# Patient Record
Sex: FEMALE | Race: WHITE | Employment: OTHER | URBAN - METROPOLITAN AREA
[De-identification: names, ages, dates, MRNs, and addresses within clinical notes are randomized per-mention and may not be internally consistent; named-entity substitution may affect disease eponyms.]

---

## 2022-03-01 ENCOUNTER — OFFICE VISIT (OUTPATIENT)
Dept: CARDIOLOGY CLINIC | Age: 62
End: 2022-03-01
Payer: COMMERCIAL

## 2022-03-01 VITALS
DIASTOLIC BLOOD PRESSURE: 86 MMHG | OXYGEN SATURATION: 98 % | HEIGHT: 62 IN | RESPIRATION RATE: 18 BRPM | SYSTOLIC BLOOD PRESSURE: 124 MMHG | WEIGHT: 155.8 LBS | BODY MASS INDEX: 28.67 KG/M2 | HEART RATE: 62 BPM

## 2022-03-01 DIAGNOSIS — E78.5 DYSLIPIDEMIA: ICD-10-CM

## 2022-03-01 DIAGNOSIS — R00.0 TACHYARRHYTHMIA: Primary | ICD-10-CM

## 2022-03-01 DIAGNOSIS — I48.91 ATRIAL FIBRILLATION, UNSPECIFIED TYPE (HCC): ICD-10-CM

## 2022-03-01 PROCEDURE — 99204 OFFICE O/P NEW MOD 45 MIN: CPT | Performed by: INTERNAL MEDICINE

## 2022-03-01 PROCEDURE — 93000 ELECTROCARDIOGRAM COMPLETE: CPT | Performed by: INTERNAL MEDICINE

## 2022-03-01 NOTE — PROGRESS NOTES
1. Have you been to the ER, urgent care clinic since your last visit? Hospitalized since your last visit? Yes, 2/22/22, [address], Heart Shocked    2. Have you seen or consulted any other health care providers outside of the [address] [place] [address] since your last visit? Include any pap smears or colon screening.  No         Chief Complaint   Patient presents with    Irregular Heart Beat     C/O  Palpitations

## 2022-03-01 NOTE — PROGRESS NOTES
Subjective:      Cesar Adamson is a 59 y.o. female is here for EP consult. Here to reestablish care. Last seen in 2010. Was with her gradndaughter and felt her heart racing. Went to the [facility] urgent care and was told she was in AF with low K and elevated tsh. I do not have a copy of those records.     Patient Active Problem List    Diagnosis Date Noted    Intervertebral thoracic disc disorder with myelopathy, thoracic region 11/11/2014    Dyslipidemia 06/30/2009    Obesity 06/30/2009    Tachyarrhythmia 06/30/2009    Back pain 06/30/2009    Osteoarthritis 06/30/2009    Family history of colon cancer 06/30/2009      Gaby Meza MD  Past Medical History:   Diagnosis Date    Atrial fibrillation [facility])     Chronic pain     back    Clotting disorder ([facility] [address]     Dyslipidemia 6/30/2009    Ill-defined condition     high cholesterol    Long term current use of anticoagulant therapy     Nausea & vomiting     Obesity 6/30/2009    Osteoarthritis 6/30/2009    Skipped beats     Tachyarrhythmia 6/30/2009    with skipped beats      Past Surgical History:   Procedure Laterality Date    HX HYSTERECTOMY      partial    HX TONSILLECTOMY      [place] BREAST SURGERY PROCEDURE UNLISTED Left     breast biopsy- neg    [place] CARDIAC SURG PROCEDURE UNLIST      ablation     No Known Allergies   Family History   Problem Relation Age of Onset    Cancer Mother         uterine, colon, thyroid, lung    Heart Disease Father     Lung Disease Father     No Known Problems Sister     No Known Problems Sister     No Known Problems Sister     Colon Cancer Other     Diabetes Other     Heart Disease Other     Hypertension Other     Cancer Other         Uterine and Lung    negative for cardiac disease  Social History     Socioeconomic History    Marital status:    Tobacco Use    Smoking status: Former Smoker     Packs/day: 1.50     Years: 10.00     Pack years: 15.00    Smokeless tobacco: Never Used   Vaping Use    Vaping Use: Never used   Substance and Sexual Activity    Alcohol use: Yes     Alcohol/week: 0.8 standard drinks     Types: 1 Cans of beer per week     Comment: occasionally    Drug use: No   Social History Narrative    ** Merged History Encounter **          Current Outpatient Medications   Medication Sig    apixaban (Eliquis) 5 mg tablet Take 5 mg by mouth two (2) times a day.  acetaminophen (TYLENOL EXTRA STRENGTH) 500 mg tablet Take  by mouth every six (6) hours as needed for Pain.  rosuvastatin (CRESTOR) 20 mg tablet Take 20 mg by mouth nightly. Indications: HYPERCHOLESTEROLEMIA    diazepam (VALIUM) 5 mg tablet Take 1 tablet by mouth every six (6) hours as needed (spasm). (Patient not taking: Reported on 3/1/2022)    oxyCODONE IR (ROXICODONE) 5 mg immediate release tablet Take 1 tablet by mouth every three (3) hours as needed for Pain. (Patient not taking: Reported on 3/1/2022)    conjugated estrogens (PREMARIN) 0.625 mg tablet Take 0.625 mg by mouth nightly. (Patient not taking: Reported on 3/1/2022)    OMEPRAZOLE PO Take 20 mg by mouth as needed. (Patient not taking: Reported on 3/1/2022)     No current facility-administered medications for this visit. Vitals:    03/01/22 0928   BP: 124/86   Pulse: 62   Resp: 18   SpO2: 98%   Weight: 155 lb 12.8 oz (70.7 kg)   Height: 5' 2\" (1.575 m)       I have reviewed the nurses notes, vitals, problem list, allergy list, medical history, family, social history and medications. Review of Symptoms:    General: Pt denies excessive weight gain or loss. Pt is able to conduct ADL's  HEENT: Denies blurred vision, headaches, hearing loss, epistaxis and difficulty swallowing. Respiratory: Denies cough, congestion, shortness of breath, MABRY, wheezing or stridor.   Cardiovascular: +palpitations, enies precordial pain, edema or PND  Gastrointestinal: Denies poor appetite, indigestion, abdominal pain or blood in stool  Genitourinary: Denies hematuria, dysuria, increased urinary frequency  Musculoskeletal: Denies joint pain or swelling from muscles or joints  Neurologic: Denies tremor, paresthesias, headache, or sensory motor disturbance  Psychiatric: Denies confusion, insomnia, depression  Integumentray: Denies rash, itching or ulcers. Hematologic: Denies easy bruising, bleeding    Physical Exam:      General: Well developed, in no acute distress. HEENT: Eyes - PERRL, no jvd  Heart:  Normal S1/S2 negative S3 or S4. Regular, no murmur, gallop or rub. Respiratory: Clear bilaterally x 4, no wheezing or rales  Abdomen:   Soft, non-tender, bowel sounds are active. Extremities:  No edema, normal cap refill, no cyanosis. Musculoskeletal: No clubbing  Neuro: A&Ox3, speech clear, gait stable. Skin: Skin color is normal. No rashes or lesions.  Non diaphoretic, no ulcers or subcutaneous nodule  Vascular: 2+ pulses symmetric in all extremities  Psych - judgement intact and orientation is wnl     Cardiographics    Ekg: nsr    Results for orders placed or performed during the hospital encounter of 12/10/16   EKG, 12 LEAD, INITIAL   Result Value Ref Range    Ventricular Rate 78 BPM    Atrial Rate 78 BPM    P-R Interval 140 ms    QRS Duration 84 ms    Q-T Interval 388 ms    QTC Calculation (Bezet) 442 ms    Calculated P Axis 66 degrees    Calculated R Axis -56 degrees    Calculated T Axis 60 degrees    Diagnosis       Normal sinus rhythm  Left axis deviation  Low voltage QRS  Possible Septal infarct (cited on or before 02-MAR-2010)    When compared with ECG of 02-MAR-2010 04:00,  QRS axis shifted left  Nonspecific T wave abnormality no longer evident in Lateral leads  Confirmed by Mimi Chen MD, Brittanie Melendez ([id number]) on 12/11/2016 11:59:14 AM           Lab Results   Component Value Date/Time    WBC 6.7 12/10/2016 08:54 AM    HGB 15.0 12/10/2016 08:54 AM    HCT 43.6 12/10/2016 08:54 AM    PLATELET 467 32/58/2874 08:54 AM    MCV 91.2 12/10/2016 08:54 AM      Lab Results   Component Value Date/Time    Sodium 146 (H) 12/10/2016 08:54 AM    Potassium 4.2 12/10/2016 08:54 AM    Chloride 111 (H) 12/10/2016 08:54 AM    CO2 24 12/10/2016 08:54 AM    Anion gap 11 12/10/2016 08:54 AM    Glucose 113 (H) 12/10/2016 08:54 AM    BUN 16 12/10/2016 08:54 AM    Creatinine 0.71 12/10/2016 08:54 AM    BUN/Creatinine ratio 23 (H) 12/10/2016 08:54 AM    GFR est AA >60 12/10/2016 08:54 AM    GFR est non-AA >60 12/10/2016 08:54 AM    Calcium 8.7 12/10/2016 08:54 AM    Bilirubin, total 0.4 11/07/2014 08:20 AM    Alk. phosphatase 38 (L) 11/07/2014 08:20 AM    Protein, total 7.2 11/07/2014 08:20 AM    Albumin 3.8 11/07/2014 08:20 AM    Globulin 3.4 11/07/2014 08:20 AM    A-G Ratio 1.1 11/07/2014 08:20 AM    ALT (SGPT) 30 11/07/2014 08:20 AM         Assessment:     Assessment:        ICD-10-CM ICD-9-CM    1. Tachyarrhythmia  R00.0 785.0 AMB POC EKG ROUTINE W/ 12 LEADS, INTER & REP      ECHO ADULT COMPLETE   2. Atrial fibrillation, unspecified type (HCC)  I48.91 427.31 AMB POC EKG ROUTINE W/ 12 LEADS, INTER & REP      ECHO ADULT COMPLETE   3. Dyslipidemia  E78.5 272.4 ECHO ADULT COMPLETE     Orders Placed This Encounter    AMB POC EKG ROUTINE W/ 12 LEADS, INTER & REP     Order Specific Question:   Reason for Exam:     Answer:   ROUTINE    apixaban (Eliquis) 5 mg tablet     Sig: Take 5 mg by mouth two (2) times a day. Plan:   Andreas Seo is in sinus today. Last seen in 2010. Stable and compliant with oac for hx of afib and elevated chads vasc. This is the first episode of AF in over a decade. I have asked her to f/u with Dr Malena Godinez with regards to her low K and elevated TSH. We will obtain an echo to assess her LA size and LVEF. I do not think a monitor is currently warranted bc the frequency of this rhythm. She will f/u in 2-4 weeks post testing. Thank you for allowing me to participate in Andreas Seo 's care.     Seth Hsu MD, [facility]    On this date 03/01/2022 I have spent 45 minutes reviewing previous notes, test results and face to face with the patient discussing the diagnosis and importance of compliance with the treatment plan as well as documenting on the day of the visit.

## 2022-03-01 NOTE — LETTER
3/1/2022    Patient: Hernan Rivas   YOB: 1960   Date of Visit: 3/1/2022     Cesar Traylor MD  [address] [place] [address]  Via Fax: [phone]    Dear Cesar Traylor MD,      Thank you for referring Ms. Hernan Rivas to [address] [place], [place] [address]viv[facility] for evaluation. My notes for this consultation are attached. If you have questions, please do not hesitate to call me. I look forward to following your patient along with you.       Sincerely,    Jocelyne Baker MD

## 2022-03-02 ENCOUNTER — TELEPHONE (OUTPATIENT)
Dept: CARDIOLOGY CLINIC | Age: 62
End: 2022-03-02

## 2022-03-02 NOTE — TELEPHONE ENCOUNTER
Prior Auth initiated for eliquis. It was approved from 1/31/22-3/2/23. Case ID [id number]. Advised pharmacy and patient   Patient states she cannot afford $600 per month. Advised she needs to meet her deductible before price goes down. Patient was given the $ 10 co pay card and will try to use it.

## 2022-04-14 ENCOUNTER — ANCILLARY PROCEDURE (OUTPATIENT)
Dept: CARDIOLOGY CLINIC | Age: 62
End: 2022-04-14
Payer: COMMERCIAL

## 2022-04-14 VITALS
WEIGHT: 155 LBS | BODY MASS INDEX: 28.52 KG/M2 | SYSTOLIC BLOOD PRESSURE: 124 MMHG | DIASTOLIC BLOOD PRESSURE: 86 MMHG | HEIGHT: 62 IN

## 2022-04-14 DIAGNOSIS — I48.0 PAROXYSMAL A-FIB (HCC): ICD-10-CM

## 2022-04-14 LAB
ECHO AO ASC DIAM: 3.3 CM
ECHO AO ASCENDING AORTA INDEX: 1.92 CM/M2
ECHO AO ROOT DIAM: 2.6 CM
ECHO AO ROOT INDEX: 1.51 CM/M2
ECHO AV AREA PEAK VELOCITY: 1.9 CM2
ECHO AV AREA/BSA PEAK VELOCITY: 1.1 CM2/M2
ECHO AV PEAK GRADIENT: 6 MMHG
ECHO AV PEAK VELOCITY: 1.2 M/S
ECHO EST RA PRESSURE: 3 MMHG
ECHO LA DIAMETER INDEX: 1.8 CM/M2
ECHO LA DIAMETER: 3.1 CM
ECHO LA TO AORTIC ROOT RATIO: 1.19
ECHO LA VOL 2C: 68 ML (ref 22–52)
ECHO LA VOL 4C: 83 ML (ref 22–52)
ECHO LA VOL BP: 76 ML (ref 22–52)
ECHO LA VOL/BSA BIPLANE: 44 ML/M2 (ref 16–34)
ECHO LA VOLUME AREA LENGTH: 80 ML
ECHO LA VOLUME INDEX A2C: 40 ML/M2 (ref 16–34)
ECHO LA VOLUME INDEX A4C: 48 ML/M2 (ref 16–34)
ECHO LA VOLUME INDEX AREA LENGTH: 47 ML/M2 (ref 16–34)
ECHO LV E' LATERAL VELOCITY: 10 CM/S
ECHO LV E' SEPTAL VELOCITY: 7 CM/S
ECHO LV FRACTIONAL SHORTENING: 34 % (ref 28–44)
ECHO LV INTERNAL DIMENSION DIASTOLE INDEX: 3.08 CM/M2
ECHO LV INTERNAL DIMENSION DIASTOLIC: 5.3 CM (ref 3.9–5.3)
ECHO LV INTERNAL DIMENSION SYSTOLIC INDEX: 2.03 CM/M2
ECHO LV INTERNAL DIMENSION SYSTOLIC: 3.5 CM
ECHO LV IVSD: 1 CM (ref 0.6–0.9)
ECHO LV MASS 2D: 200.4 G (ref 67–162)
ECHO LV MASS INDEX 2D: 116.5 G/M2 (ref 43–95)
ECHO LV POSTERIOR WALL DIASTOLIC: 1 CM (ref 0.6–0.9)
ECHO LV RELATIVE WALL THICKNESS RATIO: 0.38
ECHO LVOT AREA: 3.1 CM2
ECHO LVOT DIAM: 2 CM
ECHO LVOT MEAN GRADIENT: 1 MMHG
ECHO LVOT PEAK GRADIENT: 2 MMHG
ECHO LVOT PEAK GRADIENT: 2 MMHG
ECHO LVOT PEAK VELOCITY: 0.7 M/S
ECHO LVOT PEAK VELOCITY: 0.7 M/S
ECHO LVOT STROKE VOLUME INDEX: 33.8 ML/M2
ECHO LVOT SV: 58.1 ML
ECHO LVOT VTI: 18.5 CM
ECHO MV A VELOCITY: 0.87 M/S
ECHO MV E DECELERATION TIME (DT): 227 MS
ECHO MV E VELOCITY: 0.6 M/S
ECHO MV E/A RATIO: 0.69
ECHO MV E/E' LATERAL: 6
ECHO MV E/E' RATIO (AVERAGED): 7.29
ECHO MV E/E' SEPTAL: 8.57
ECHO MV REGURGITANT PEAK GRADIENT: 88 MMHG
ECHO MV REGURGITANT PEAK VELOCITY: 4.7 M/S
ECHO RIGHT VENTRICULAR SYSTOLIC PRESSURE (RVSP): 24 MMHG
ECHO RV TAPSE: 2.5 CM (ref 1.7–?)
ECHO TV REGURGITANT MAX VELOCITY: 2.3 M/S
ECHO TV REGURGITANT PEAK GRADIENT: 21 MMHG

## 2022-04-14 PROCEDURE — 93306 TTE W/DOPPLER COMPLETE: CPT | Performed by: INTERNAL MEDICINE

## 2022-04-21 ENCOUNTER — TELEPHONE (OUTPATIENT)
Dept: CARDIOLOGY CLINIC | Age: 62
End: 2022-04-21

## 2022-04-21 NOTE — TELEPHONE ENCOUNTER
Please call pt she called and wanted to find out the results of her echo done on 4/14/22.     Massiel Doyle

## 2022-04-22 NOTE — TELEPHONE ENCOUNTER
Verified patient with 2 identifiers   Advised per Dr Alvarez Brown Normal lvef, mod LAE. Patient verified understanding  Gave patient new number to the private practice. She will call on May 2, 2022 to schedule follow up.

## 2022-07-13 ENCOUNTER — OFFICE VISIT (OUTPATIENT)
Dept: ORTHOPEDIC SURGERY | Age: 62
End: 2022-07-13
Payer: COMMERCIAL

## 2022-07-13 VITALS — WEIGHT: 145 LBS | BODY MASS INDEX: 26.68 KG/M2 | HEIGHT: 62 IN

## 2022-07-13 DIAGNOSIS — S46.011A TRAUMATIC INCOMPLETE TEAR OF RIGHT ROTATOR CUFF, INITIAL ENCOUNTER: Primary | ICD-10-CM

## 2022-07-13 PROCEDURE — 99203 OFFICE O/P NEW LOW 30 MIN: CPT | Performed by: ORTHOPAEDIC SURGERY

## 2022-07-13 NOTE — LETTER
7/13/2022    Patient: Naseem Melgoza   YOB: 1960   Date of Visit: 7/13/2022     Kemal Hernandez MD  [address] [place] [address]  Via Fax: [phone]    Dear Kemal Hernandez MD,      Thank you for referring Ms. Naseem Melgoza to [facility] for evaluation. My notes for this consultation are attached. If you have questions, please do not hesitate to call me. I look forward to following your patient along with you.       Sincerely,    Erica Paz, DO

## 2022-07-13 NOTE — PROGRESS NOTES
Babak Ram (: 1960) is a 57 y.o. female, new patient, here for evaluation of the following chief complaint(s):  Shoulder Pain       ASSESSMENT/PLAN:  Below is the assessment and plan developed based on review of pertinent history, physical exam, labs, studies, and medications. Findings were discussed with the patient today. We will obtain an MRI which will help us with further treatment planning including the possibility of surgical treatment. Patient will follow up after this MRI is performed. 1. Traumatic incomplete tear of right rotator cuff, initial encounter  -     MRI SHOULDER RT WO CONT; Future      Return for imaging results after study performed. SUBJECTIVE/OBJECTIVE:  Babak Ram (: 1960) is a 57 y.o. female. She notes history of right shoulder pain and has received 3 corticosteroid injections. Most recent corticosteroid injection was on Wednesday by her primary care physician. However, on  she felt a sharp pain in her shoulder and went to the ER. X-rays showed evidence of a right shoulder subluxation. She underwent a reduction maneuver and notes that her pain improved. She has been in a sling since this happened. She notes clicking and popping in the shoulder. No Known Allergies    Current Outpatient Medications   Medication Sig    apixaban (Eliquis) 5 mg tablet Take 5 mg by mouth two (2) times a day.  acetaminophen (TYLENOL EXTRA STRENGTH) 500 mg tablet Take  by mouth every six (6) hours as needed for Pain.  rosuvastatin (CRESTOR) 20 mg tablet Take 20 mg by mouth nightly. Indications: HYPERCHOLESTEROLEMIA    diazepam (VALIUM) 5 mg tablet Take 1 tablet by mouth every six (6) hours as needed (spasm). (Patient not taking: Reported on 3/1/2022)    oxyCODONE IR (ROXICODONE) 5 mg immediate release tablet Take 1 tablet by mouth every three (3) hours as needed for Pain.  (Patient not taking: Reported on 3/1/2022)    conjugated estrogens (PREMARIN) 0.625 mg tablet Take 0.625 mg by mouth nightly. (Patient not taking: Reported on 3/1/2022)    OMEPRAZOLE PO Take 20 mg by mouth as needed. (Patient not taking: Reported on 3/1/2022)     No current facility-administered medications for this visit. Social History     Socioeconomic History    Marital status:      Spouse name: Not on file    Number of children: Not on file    Years of education: Not on file    Highest education level: Not on file   Occupational History    Not on file   Tobacco Use    Smoking status: Former Smoker     Packs/day: 1.50     Years: 10.00     Pack years: 15.00    Smokeless tobacco: Never Used   Vaping Use    Vaping Use: Never used   Substance and Sexual Activity    Alcohol use: Yes     Alcohol/week: 0.8 standard drinks     Types: 1 Cans of beer per week     Comment: occasionally    Drug use: No    Sexual activity: Not on file   Other Topics Concern    Not on file   Social History Narrative    ** Merged History Encounter **          Social Determinants of Health     Financial Resource Strain:     Difficulty of Paying Living Expenses: Not on file   Food Insecurity:     Worried About Running Out of Food in the Last Year: Not on file    Mary of Food in the Last Year: Not on file   Transportation Needs:     Lack of Transportation (Medical): Not on file    Lack of Transportation (Non-Medical):  Not on file   Physical Activity:     Days of Exercise per Week: Not on file    Minutes of Exercise per Session: Not on file   Stress:     Feeling of Stress : Not on file   Social Connections:     Frequency of Communication with Friends and Family: Not on file    Frequency of Social Gatherings with Friends and Family: Not on file    Attends Jewish Services: Not on file    Active Member of Clubs or Organizations: Not on file    Attends Club or Organization Meetings: Not on file    Marital Status: Not on file   Intimate Partner Violence:     Fear of Current or Ex-Partner: Not on file    Emotionally Abused: Not on file    Physically Abused: Not on file    Sexually Abused: Not on file   Housing Stability:     Unable to Pay for Housing in the Last Year: Not on file    Number of Places Lived in the Last Year: Not on file    Unstable Housing in the Last Year: Not on file       Past Surgical History:   Procedure Laterality Date    HX HYSTERECTOMY      partial    HX TONSILLECTOMY      [place] BREAST SURGERY PROCEDURE UNLISTED Left     breast biopsy- neg    [place] CARDIAC SURG PROCEDURE UNLIST      ablation       Family History   Problem Relation Age of Onset    Cancer Mother         uterine, colon, thyroid, lung    Heart Disease Father     Lung Disease Father     No Known Problems Sister     No Known Problems Sister     No Known Problems Sister     Colon Cancer Other     Diabetes Other     Heart Disease Other     Hypertension Other     Cancer Other         Uterine and Lung        OB History    No obstetric history on file. REVIEW OF SYSTEMS:  ROS     Positive for: Musculoskeletal    Last edited by Charleen Matamoros on 7/13/2022  8:57 AM. (History)        Patient denies any recent fever, chills, nausea, vomiting, chest pain, or shortness of breath. Vitals:  Ht 5' 2\" (1.575 m)   Wt 145 lb (65.8 kg)   BMI 26.52 kg/m²    Body mass index is 26.52 kg/m². PHYSICAL EXAM:  General exam: Patient is awake, alert, and oriented x3. Well-appearing. No acute distress. Ambulates with a normal gait. Right shoulder: Neurovascular and sensory intact. There is tenderness to palpation at the anterior lateral shoulder. Limited passive range of motion is noted. There is limited active range of motion with only 20 to 30 degrees of forward flexion abduction at this time. Pain is noted with impingement testing including Jaramillo exam.  Pain and weakness 4/5 is noted with rotator cuff strength testing including resisted abduction and resisted external rotation.   Normal stability. There is some tenderness palpation at the [facility] joint and pain with crossarm exam.        IMAGING:    XR Results (most recent):  Results from Hospital Encounter encounter on 11/11/14    XR SPINE LUMB 2 OR 3 V    Narrative  **Final Report**      ICD Codes / Adm. Diagnosis:    / THORACIC HNP, THORACIC RADICUL  Intervertebral thoracic disc  Examination:  CR L SPINE 2 OR 3 S  - [id number] - Nov 12 2014  3:00PM  Accession No:  [id number]  Reason:  Post-op lumbar fusion      REPORT:  INDICATION:   Post-op lumbar fusion    COMPARISON: None    FINDINGS:    AP, lateral, views of the lumbar spine demonstrate posterior fusion hardware  T11-12 with interbody graft marker at this level. There is slight rightward  curvature of the lumbar spine at L3. No evidence of acute fracture. Mild  degenerative osteophytes T12-L1, L1-L2 and L2-3. The sacroiliac joints are  intact. Impression  :  Status post fusion T11-12. Mild degenerative changes. Signing/Reading Doctor: Contreras Díaz ([id number])  Approved: Contreras Díaz ([id number])  Nov 12 2014  3:15PM      XR SPINE LUMB 2 OR 3 V    Narrative  **Final Report**      ICD Codes / Adm. Diagnosis:    / THORACIC HNP, THORACIC RADICUL  Intervertebral thoracic disc  Examination:  CR L SPINE 2 OR 3 [facility]  - [id number] - Nov 11 2014 10:00AM  Accession No:  [id number]  Reason:  OR PROCEDURE      REPORT:  EXAM:  CR L SPINE 2 OR 3 VWS    INDICATION:  Back pain requiring surgery    COMPARISON: None. TECHNIQUE: 3 spot fluoroscopic images of 2 views intraoperative lumbar  spine. Compliance only. FINDINGS: Fusion level appears to be T11-T12. Bilateral interpedicular  screws and bilateral vertical fusion rods. Disc prosthesis is in good  position. No evidence of fracture or subluxation. Total fluoroscopy time of 1.8 minutes. Impression  :    Spinal fusion in progress.           Signing/Reading Doctor: Ron Patient ([id number])  Approved: Ron Patient ([id number])  Nov 11 2014  2:13PM      XR FLUOROSCOPY OVER 60 MINUTES    Narrative  **Final Report**      ICD Codes / Adm. Diagnosis:    / THORACIC HNP, THORACIC RADICUL  Intervertebral thoracic disc  Examination:  CR FLUORO GUIDE GT 1 HR  - [id number] - Nov 11 2014 10:00AM  Accession No:  [id number]  Reason:  OR PROCEDURE      REPORT:  Fluoroscopy time was provided. Impression  :  Fluoroscopy time was provided. [facility]          Signing/Reading Doctor: MIREYA  ([id number])  Devin Galindo  ([id number])  Nov 18 2014  8:03AM         Orders Placed This Encounter    MRI SHOULDER RT WO CONT     Standing Status:   Future     Standing Expiration Date:   11/13/2022     Order Specific Question:   Arthrogram study     Answer: No              An electronic signature was used to authenticate this note.   -- Mariely Dugan, DO

## 2022-07-21 ENCOUNTER — HOSPITAL ENCOUNTER (OUTPATIENT)
Dept: MRI IMAGING | Age: 62
Discharge: HOME | End: 2022-07-21
Attending: ORTHOPAEDIC SURGERY
Payer: COMMERCIAL

## 2022-07-21 DIAGNOSIS — S46.011A TRAUMATIC INCOMPLETE TEAR OF RIGHT ROTATOR CUFF, INITIAL ENCOUNTER: ICD-10-CM

## 2022-07-21 PROCEDURE — 73221 MRI JOINT UPR EXTREM W/O DYE: CPT

## 2022-07-25 ENCOUNTER — OFFICE VISIT (OUTPATIENT)
Dept: ORTHOPEDIC SURGERY | Age: 62
End: 2022-07-25
Payer: COMMERCIAL

## 2022-07-25 DIAGNOSIS — M75.41 IMPINGEMENT SYNDROME OF RIGHT SHOULDER: Primary | ICD-10-CM

## 2022-07-25 DIAGNOSIS — M75.121 NONTRAUMATIC COMPLETE TEAR OF RIGHT ROTATOR CUFF: ICD-10-CM

## 2022-07-25 DIAGNOSIS — M67.921 BICEPS TENDINOPATHY, RIGHT: ICD-10-CM

## 2022-07-25 DIAGNOSIS — M19.011 ARTHRITIS OF RIGHT ACROMIOCLAVICULAR JOINT: ICD-10-CM

## 2022-07-25 PROCEDURE — 99214 OFFICE O/P EST MOD 30 MIN: CPT | Performed by: ORTHOPAEDIC SURGERY

## 2022-07-25 NOTE — PATIENT INSTRUCTIONS
What to expect after Shoulder Arthroscopy   Dr. Oliva Arguello should not have ANYTHING to eat or drink after midnight the night before your surgery. This includes NO gum, mints, candy, lifesavers or lollipops! Please make sure to remove ALL jewelry. When you arrive at the hospital or surgery center, you will be checked in and given an IV. You will be offered a nerve block, which I do recommend. This will be done pre-operatively by the anesthesiologist. It is an injection around the base of your neck that numbs the nerves to your shoulder and arm. It lasts anywhere from 12 hours to 3 days. This will give you pain relief that hopefully lasts throughout your first night. When the nerve block wears off, you will likely be in pain. This can range from mild to severe. If you experience severe pain, this is still normal. Nothing is wrong. You would have woken up with worse pain if you did not have the nerve block! During first three days, the pain is usually the worst, and then gradually subsides after that. Swelling in the shoulder, elbow and hands is normal. You may also experience bruising in the arm   If you elected to have the nerve block, you may have some residual numbness that may last weeks. You will likely continue to have pain for several weeks or even months. Recovery from shoulder surgery takes several months. BE PATIENT! Wear the sling at all times (even sleeping!) except for showering and for the exercises listed below. All you need to do for the first five weeks is the following (four times per day): Remove your sling and:   Flex and extend your elbow with your elbow next to your side. You may be instructed to begin shoulder pendulum exercises after your first post-operative visit:  lean forward slightly, and with your arm hanging down and your hand pointing toward the floor, perform small circles with your arm and shoulder.      At your first follow-up visit, you will have your sutures removed and will be given a script for physical therapy. If you did NOT have a repair, your therapy will begin at that time and you will stop your sling use   If you DID have a repair, you will continue your sling until 5 weeks post-op. At 5 weeks post-op, you will begin physical therapy and discontinue your sling that day   You will be in therapy for about 6 weeks - 10 weeks.    Driving is dangerous while in a sling and it is recommended that you wait until you are out of your sling to begin. (unless otherwise directed by your physician)   Your restrictions will be as follows: (unless otherwise directed by your physician)   NO use of the arm/shoulder (except for writing / typing, fine manipulation) for the first two weeks   If you DID NOT have a repair:   NO lifting / carrying / pushing / pulling greater than 10 lbs for 6 weeks   No repetitive overhead activity for 6 weeks   Return to sports typically at 6 -8 weeks   If you DID have a repair   NO lifting / carrying / pushing / pulling greater than 10 lbs for 4 months   No repetitive overhead activity for 4 months   Return to sports: 4-6 months depending on the sport  If you have any questions or concerns throughout this process, call the [place] [id number] office at [phone]

## 2022-08-02 DIAGNOSIS — M75.121 NONTRAUMATIC COMPLETE TEAR OF RIGHT ROTATOR CUFF: ICD-10-CM

## 2022-08-02 DIAGNOSIS — M75.41 IMPINGEMENT SYNDROME OF RIGHT SHOULDER: Primary | ICD-10-CM

## 2022-08-02 DIAGNOSIS — M19.011 ARTHRITIS OF RIGHT ACROMIOCLAVICULAR JOINT: ICD-10-CM

## 2022-08-02 DIAGNOSIS — M67.921 BICEPS TENDINOPATHY, RIGHT: ICD-10-CM

## 2022-09-06 DIAGNOSIS — Z98.890 STATUS POST ARTHROSCOPY OF SHOULDER: Primary | ICD-10-CM

## 2022-09-06 RX ORDER — OXYCODONE HYDROCHLORIDE 5 MG/1
5 TABLET ORAL
Qty: 28 TABLET | Refills: 0 | Status: SHIPPED | OUTPATIENT
Start: 2022-09-06 | End: 2022-09-13

## 2022-09-06 RX ORDER — NALOXONE HYDROCHLORIDE 4 MG/.1ML
SPRAY NASAL
Qty: 1 EACH | Refills: 0 | Status: SHIPPED | OUTPATIENT
Start: 2022-09-06

## 2022-09-14 ENCOUNTER — OFFICE VISIT (OUTPATIENT)
Dept: ORTHOPEDIC SURGERY | Age: 62
End: 2022-09-14
Payer: COMMERCIAL

## 2022-09-14 VITALS — WEIGHT: 145 LBS | HEIGHT: 62 IN | BODY MASS INDEX: 26.68 KG/M2

## 2022-09-14 DIAGNOSIS — Z98.890 STATUS POST ARTHROSCOPY OF SHOULDER: Primary | ICD-10-CM

## 2022-09-14 PROCEDURE — 99024 POSTOP FOLLOW-UP VISIT: CPT | Performed by: ORTHOPAEDIC SURGERY

## 2022-09-14 NOTE — PROGRESS NOTES
Briana Andre (: 1960) is a 57 y.o. female, established patient, here for evaluation of the following chief complaint(s):  Post OP Follow Up (shoulder)       ASSESSMENT/PLAN:  Below is the assessment and plan developed based on review of pertinent history, physical exam, labs, studies, and medications. Findings were discussed with the patient today. We will hold off on physical therapy as this was a large rotator cuff repair. I will plan to see her back in 1 month and we will start therapy at that time. We discussed continuing in her sling    1. Status post arthroscopy of shoulder    Return in about 4 weeks (around 10/12/2022). SUBJECTIVE/OBJECTIVE:  Briana Andre (: 1960) is a 57 y.o. female. She notes pain has been tolerable and this is her first postop visit status post massive right rotator cuff repair. No Known Allergies    Current Outpatient Medications   Medication Sig    naloxone (NARCAN) 4 mg/actuation nasal spray Use 1 spray intranasally, then discard. Repeat with new spray every 2 min as needed for opioid overdose symptoms, alternating nostrils. apixaban (Eliquis) 5 mg tablet Take 5 mg by mouth two (2) times a day. diazepam (VALIUM) 5 mg tablet Take 1 tablet by mouth every six (6) hours as needed (spasm). (Patient not taking: Reported on 3/1/2022)    oxyCODONE IR (ROXICODONE) 5 mg immediate release tablet Take 1 tablet by mouth every three (3) hours as needed for Pain. (Patient not taking: Reported on 3/1/2022)    acetaminophen (TYLENOL EXTRA STRENGTH) 500 mg tablet Take  by mouth every six (6) hours as needed for Pain. rosuvastatin (CRESTOR) 20 mg tablet Take 20 mg by mouth nightly. Indications: HYPERCHOLESTEROLEMIA    conjugated estrogens (PREMARIN) 0.625 mg tablet Take 0.625 mg by mouth nightly. (Patient not taking: Reported on 3/1/2022)    OMEPRAZOLE PO Take 20 mg by mouth as needed.  (Patient not taking: Reported on 3/1/2022)     No current facility-administered medications for this visit. Social History     Socioeconomic History    Marital status:      Spouse name: Not on file    Number of children: Not on file    Years of education: Not on file    Highest education level: Not on file   Occupational History    Not on file   Tobacco Use    Smoking status: Former     Packs/day: 1.50     Years: 10.00     Pack years: 15.00     Types: Cigarettes    Smokeless tobacco: Never   Vaping Use    Vaping Use: Never used   Substance and Sexual Activity    Alcohol use: Yes     Alcohol/week: 0.8 standard drinks     Types: 1 Cans of beer per week     Comment: occasionally    Drug use: No    Sexual activity: Not on file   Other Topics Concern    Not on file   Social History Narrative    ** Merged History Encounter **          Social Determinants of Health     Financial Resource Strain: Not on file   Food Insecurity: Not on file   Transportation Needs: Not on file   Physical Activity: Not on file   Stress: Not on file   Social Connections: Not on file   Intimate Partner Violence: Not on file   Housing Stability: Not on file       Past Surgical History:   Procedure Laterality Date    HX HYSTERECTOMY      partial    HX TONSILLECTOMY      [place] BREAST SURGERY PROCEDURE UNLISTED Left     breast biopsy- neg    HI CARDIAC SURG PROCEDURE UNLIST      ablation       Family History   Problem Relation Age of Onset    Cancer Mother         uterine, colon, thyroid, lung    Heart Disease Father     Lung Disease Father     No Known Problems Sister     No Known Problems Sister     No Known Problems Sister     Colon Cancer Other     Diabetes Other     Heart Disease Other     Hypertension Other     Cancer Other         Uterine and Lung        OB History    No obstetric history on file.             REVIEW OF SYSTEMS:  ROS    Positive for: Musculoskeletal  Last edited by Citlali Ferraro on 9/14/2022  8:13 AM.        Patient denies any recent fever, chills, nausea, vomiting, chest pain, or shortness of breath. Vitals:  Ht 5' 2\" (1.575 m)   Wt 145 lb (65.8 kg)   BMI 26.52 kg/m²    Body mass index is 26.52 kg/m². PHYSICAL EXAM:  General exam: Patient is awake, alert, and oriented x3. Well-appearing. No acute distress. Ambulates with a normal gait. Right shoulder: Neurovascular and sensory intact. Incisions well-healed with no signs of erythema or drainage. Mild swelling and ecchymosis. Decreased range of motion. Normal stability. IMAGING:    XR Results (most recent):  Results from Hospital Encounter encounter on 11/11/14    XR SPINE LUMB 2 OR 3 V    Narrative  **Final Report**      ICD Codes / Adm. Diagnosis:    / THORACIC HNP, THORACIC RADICUL  Intervertebral thoracic disc  Examination:  CR L SPINE 2 OR 3 [facility]  - [id number] - Nov 12 2014  3:00PM  Accession No:  [id number]  Reason:  Post-op lumbar fusion      REPORT:  INDICATION:   Post-op lumbar fusion    COMPARISON: None    FINDINGS:    AP, lateral, views of the lumbar spine demonstrate posterior fusion hardware  T11-12 with interbody graft marker at this level. There is slight rightward  curvature of the lumbar spine at L3. No evidence of acute fracture. Mild  degenerative osteophytes T12-L1, L1-L2 and L2-3. The sacroiliac joints are  intact. Impression  :  Status post fusion T11-12. Mild degenerative changes. Signing/Reading Doctor: Rocio Gonzalez ([id number])  Approved: Rocio Gonzalez ([id number])  Nov 12 2014  3:15PM      XR SPINE LUMB 2 OR 3 V    Narrative  **Final Report**      ICD Codes / Adm. Diagnosis:    / THORACIC HNP, THORACIC RADICUL  Intervertebral thoracic disc  Examination:  CR L SPINE 2 OR 3 [facility]  - [id number] - Nov 11 2014 10:00AM  Accession No:  [id number]  Reason:  OR PROCEDURE      REPORT:  EXAM:  CR L SPINE 2 OR 3 S    INDICATION:  Back pain requiring surgery    COMPARISON: None. TECHNIQUE: 3 spot fluoroscopic images of 2 views intraoperative lumbar  spine. Compliance only.     FINDINGS: Fusion level appears to be T11-T12. Bilateral interpedicular  screws and bilateral vertical fusion rods. Disc prosthesis is in good  position. No evidence of fracture or subluxation. Total fluoroscopy time of 1.8 minutes. Impression  :    Spinal fusion in progress. Signing/Reading Doctor: Cyndi Cotne ([id number])  Approved: Cyndi Conte ([id number])  Nov 11 2014  2:13PM      XR FLUOROSCOPY OVER 60 MINUTES    Narrative  **Final Report**      ICD Codes / Adm. Diagnosis:    / THORACIC HNP, THORACIC RADICUL  Intervertebral thoracic disc  Examination:  CR FLUORO GUIDE GT 1 HR  - [id number] - Nov 11 2014 10:00AM  Accession No:  [id number]  Reason:  OR PROCEDURE      REPORT:  Fluoroscopy time was provided. Impression  :  Fluoroscopy time was provided. [facility]          Signing/Reading Doctor: BSR  ([id number])  ApprovedHitesh Crain  ([id number])  Nov 18 2014  8:03AM         No orders of the defined types were placed in this encounter. An electronic signature was used to authenticate this note.   -- Kassandra Palafox, DO

## 2022-09-14 NOTE — LETTER
9/14/2022    Patient: Babak Ram   YOB: 1960   Date of Visit: 9/14/2022     Martínez Duran MD  [address] [place] [address]  Via Fax: [phone]    Dear Martínez Duran MD,      Thank you for referring Ms. Babak Ram to [facility] for evaluation. My notes for this consultation are attached. If you have questions, please do not hesitate to call me. I look forward to following your patient along with you.       Sincerely,    Danielle Morley, DO

## 2022-10-12 ENCOUNTER — OFFICE VISIT (OUTPATIENT)
Dept: ORTHOPEDIC SURGERY | Age: 62
End: 2022-10-12
Payer: COMMERCIAL

## 2022-10-12 VITALS — BODY MASS INDEX: 26.68 KG/M2 | WEIGHT: 145 LBS | HEIGHT: 62 IN

## 2022-10-12 DIAGNOSIS — Z98.890 STATUS POST ARTHROSCOPY OF SHOULDER: Primary | ICD-10-CM

## 2022-10-12 PROCEDURE — 99024 POSTOP FOLLOW-UP VISIT: CPT | Performed by: ORTHOPAEDIC SURGERY

## 2022-10-12 NOTE — PROGRESS NOTES
Wilton Arvizu (: 1960) is a 57 y.o. female, established patient, here for evaluation of the following chief complaint(s):  Post OP Follow Up and Shoulder Pain       ASSESSMENT/PLAN:  Below is the assessment and plan developed based on review of pertinent history, physical exam, labs, studies, and medications. Overall she seems to be progressing well. She will start a therapy regimen for passive range of motion. I will see her back in 1 month    1. Status post arthroscopy of shoulder  -     REFERRAL TO PHYSICAL THERAPY      No follow-ups on file. SUBJECTIVE/OBJECTIVE:  Wilton Arvizu (: 1960) is a 57 y.o. female. She presents today 1 month status post large right rotator cuff repair. Overall doing well with minimal pain        No Known Allergies    Current Outpatient Medications   Medication Sig    naloxone (NARCAN) 4 mg/actuation nasal spray Use 1 spray intranasally, then discard. Repeat with new spray every 2 min as needed for opioid overdose symptoms, alternating nostrils. apixaban (Eliquis) 5 mg tablet Take 5 mg by mouth two (2) times a day. diazepam (VALIUM) 5 mg tablet Take 1 tablet by mouth every six (6) hours as needed (spasm). (Patient not taking: Reported on 3/1/2022)    oxyCODONE IR (ROXICODONE) 5 mg immediate release tablet Take 1 tablet by mouth every three (3) hours as needed for Pain. (Patient not taking: Reported on 3/1/2022)    acetaminophen (TYLENOL EXTRA STRENGTH) 500 mg tablet Take  by mouth every six (6) hours as needed for Pain. rosuvastatin (CRESTOR) 20 mg tablet Take 20 mg by mouth nightly. Indications: HYPERCHOLESTEROLEMIA    conjugated estrogens (PREMARIN) 0.625 mg tablet Take 0.625 mg by mouth nightly. (Patient not taking: Reported on 3/1/2022)    OMEPRAZOLE PO Take 20 mg by mouth as needed. (Patient not taking: Reported on 3/1/2022)     No current facility-administered medications for this visit.        Social History     Socioeconomic History    Marital status:      Spouse name: Not on file    Number of children: Not on file    Years of education: Not on file    Highest education level: Not on file   Occupational History    Not on file   Tobacco Use    Smoking status: Former     Packs/day: 1.50     Years: 10.00     Pack years: 15.00     Types: Cigarettes    Smokeless tobacco: Never   Vaping Use    Vaping Use: Never used   Substance and Sexual Activity    Alcohol use: Yes     Alcohol/week: 0.8 standard drinks     Types: 1 Cans of beer per week     Comment: occasionally    Drug use: No    Sexual activity: Not on file   Other Topics Concern    Not on file   Social History Narrative    ** Merged History Encounter **          Social Determinants of Health     Financial Resource Strain: Not on file   Food Insecurity: Not on file   Transportation Needs: Not on file   Physical Activity: Not on file   Stress: Not on file   Social Connections: Not on file   Intimate Partner Violence: Not on file   Housing Stability: Not on file       Past Surgical History:   Procedure Laterality Date    HX HYSTERECTOMY      partial    HX TONSILLECTOMY      [place] BREAST SURGERY PROCEDURE UNLISTED Left     breast biopsy- neg    [place] CARDIAC SURG PROCEDURE UNLIST      ablation       Family History   Problem Relation Age of Onset    Cancer Mother         uterine, colon, thyroid, lung    Heart Disease Father     Lung Disease Father     No Known Problems Sister     No Known Problems Sister     No Known Problems Sister     Colon Cancer Other     Diabetes Other     Heart Disease Other     Hypertension Other     Cancer Other         Uterine and Lung        OB History    No obstetric history on file. REVIEW OF SYSTEMS:  ROS    Positive for: Musculoskeletal  Last edited by Shereen Arambula on 10/12/2022  9:35 AM.        Patient denies any recent fever, chills, nausea, vomiting, chest pain, or shortness of breath.       Vitals:  Ht 5' 2\" (1.575 m)   Wt 145 lb (65.8 kg)   BMI 26.52 kg/m² Body mass index is 26.52 kg/m². PHYSICAL EXAM:  General exam: Patient is awake, alert, and oriented x3. Well-appearing. No acute distress. Ambulates with a normal gait. Right shoulder: Passive forward flexion abduction with 90 degrees today. Incisions are well-healed    IMAGING:    XR Results (most recent):  Results from Hospital Encounter encounter on 11/11/14    XR SPINE LUMB 2 OR 3 V    Narrative  **Final Report**      ICD Codes / Adm. Diagnosis:    / THORACIC HNP, THORACIC RADICUL  Intervertebral thoracic disc  Examination:  CR L SPINE 2 OR 3 [facility]  - [id number] - Nov 12 2014  3:00PM  Accession No:  [id number]  Reason:  Post-op lumbar fusion      REPORT:  INDICATION:   Post-op lumbar fusion    COMPARISON: None    FINDINGS:    AP, lateral, views of the lumbar spine demonstrate posterior fusion hardware  T11-12 with interbody graft marker at this level. There is slight rightward  curvature of the lumbar spine at L3. No evidence of acute fracture. Mild  degenerative osteophytes T12-L1, L1-L2 and L2-3. The sacroiliac joints are  intact. Impression  :  Status post fusion T11-12. Mild degenerative changes. Signing/Reading Doctor: Sincere Silverio ([id number])  Approved: Sincere Silverio ([id number])  Nov 12 2014  3:15PM      XR SPINE LUMB 2 OR 3 V    Narrative  **Final Report**      ICD Codes / Adm. Diagnosis:    / THORACIC HNP, THORACIC RADICUL  Intervertebral thoracic disc  Examination:  CR L SPINE 2 OR 3 [facility]  - [id number] - Nov 11 2014 10:00AM  Accession No:  [id number]  Reason:  OR PROCEDURE      REPORT:  EXAM:  CR L SPINE 2 OR 3 [facility]    INDICATION:  Back pain requiring surgery    COMPARISON: None. TECHNIQUE: 3 spot fluoroscopic images of 2 views intraoperative lumbar  spine. Compliance only. FINDINGS: Fusion level appears to be T11-T12. Bilateral interpedicular  screws and bilateral vertical fusion rods. Disc prosthesis is in good  position. No evidence of fracture or subluxation.     Total fluoroscopy time of 1.8 minutes. Impression  :    Spinal fusion in progress. Signing/Reading Doctor: Shamir Blank ([id number])  Approved: Shamir Blank ([id number])  Nov 11 2014  2:13PM      XR FLUOROSCOPY OVER 60 MINUTES    Narrative  **Final Report**      ICD Codes / Adm. Diagnosis:    / THORACIC HNP, THORACIC RADICUL  Intervertebral thoracic disc  Examination:  CR FLUORO GUIDE GT 1 HR  - [id number] - Nov 11 2014 10:00AM  Accession No:  [id number]  Reason:  OR PROCEDURE      REPORT:  Fluoroscopy time was provided. Impression  :  Fluoroscopy time was provided. [facility]          Signing/Reading Doctor: BSR  ([id number])  Charlotte Sexton  ([id number])  Nov 18 2014  8:03AM         Orders Placed This Encounter    REFERRAL TO PHYSICAL THERAPY     Referral Priority:   Routine     Referral Type:   PT/OT/ST     Referral Reason:   Specialty Services Required     Number of Visits Requested:   1              An electronic signature was used to authenticate this note.   -- Mei Smith DO

## 2022-10-31 ENCOUNTER — OFFICE VISIT (OUTPATIENT)
Dept: ORTHOPEDIC SURGERY | Age: 62
End: 2022-10-31
Payer: COMMERCIAL

## 2022-10-31 DIAGNOSIS — M25.511 ACUTE PAIN OF RIGHT SHOULDER: Primary | ICD-10-CM

## 2022-10-31 DIAGNOSIS — Z98.890 STATUS POST ARTHROSCOPY OF SHOULDER: ICD-10-CM

## 2022-10-31 DIAGNOSIS — M25.611 STIFFNESS OF RIGHT SHOULDER JOINT: ICD-10-CM

## 2022-10-31 PROCEDURE — 97110 THERAPEUTIC EXERCISES: CPT | Performed by: PHYSICAL THERAPIST

## 2022-10-31 PROCEDURE — 97162 PT EVAL MOD COMPLEX 30 MIN: CPT | Performed by: PHYSICAL THERAPIST

## 2022-10-31 PROCEDURE — 97140 MANUAL THERAPY 1/> REGIONS: CPT | Performed by: PHYSICAL THERAPIST

## 2022-10-31 NOTE — PROGRESS NOTES
Patient Name: Hernan Rivas  Date:10/31/2022  : 1960  [x]  Patient  Verified  Payor: Alex Donahue / Plan: [place] [id number] PPO / Product Type: PPO /    Total Treatment Time (min): 40 min   1:1 Treatment Time ( only):    Maximiliano Bright, DO  1. Acute pain of right shoulder  2. Stiffness of right shoulder joint  3. Status post arthroscopy of shoulder      Subjective:    Patient is a 57 y.o. female referred to physical therapy by Dr. Bruce Garza s/p large right rotator cuff repair. Patient reports that she dislocated her shoulder in July and had it put back in. Patient had torn her rotator cuff at that time and followed up w/MD. Patient had RCR on 22 and wears a sling at night. Patient sleeps in bed and in a recliner. She has no real c/o pain in R shoulder. Patient anxious to return to using RUE. She states that she has a 15lb lifting limit. Past medical history and medication list can otherwise be reviewed per the EHR. Objective:    Patient presents []  with sling                             [x]  without sling  []  Independent with sling management  []  Needs assistance with sling management    Incisions appear intact and healing without current signs of infection. Shoulder PROM assessed in supine:  Elevation: 90 degrees  ER: 0 degree  IR:  Abduction:45 degrees    Strength and active ROM not assessed on surgical side secondary to surgical restriction. Man:  10 min  GH mobilization to the posterior/inferior capsule in combination with passive range of motion into all directions as tolerated. Ex:  20 min  Patient was instructed in a home exercise program and provided with written/visual handouts. All questions were addressed. Assessment:  Patient demonstrates decreased ROM, strength and mobility consistent with large rotator cuff repair. Patient will benefit from skilled PT to address above deficits. Long Term Goals: 8 weeks.    Patient will demonstrate ROM to within at least 95% or greater as compared to the contralateral side to assist with home/community/recreational/work ADL activity. Patient will report pain to be consistently less than or equal to 1/10 with all home/work/community/recreational ADL activity. Short Term Goals. 2 visits  Patient will report and demonstrate independence with a HEP. Plan:  Plan of care: Physical therapy consist of a frequency of 2/week for the next 6 weeks. Physical therapy will consist of therapeutic exercise, modalities, patient education, neuromuscular reeducation, manual therapy, therapeutic activity, dry needling, and instruction in home exercise program as appropriate. Eval  Ex: 20 min   Man: 10 min   NMR:      The referring physician has reviewed and approved this evaluation and plan of care as noted by the electronic signature attached to note.     Katiuska Franco, MSPT, DPT

## 2022-11-03 ENCOUNTER — OFFICE VISIT (OUTPATIENT)
Dept: ORTHOPEDIC SURGERY | Age: 62
End: 2022-11-03
Payer: COMMERCIAL

## 2022-11-03 DIAGNOSIS — Z98.890 STATUS POST ARTHROSCOPY OF SHOULDER: ICD-10-CM

## 2022-11-03 DIAGNOSIS — M25.611 STIFFNESS OF RIGHT SHOULDER JOINT: ICD-10-CM

## 2022-11-03 DIAGNOSIS — M25.511 ACUTE PAIN OF RIGHT SHOULDER: Primary | ICD-10-CM

## 2022-11-03 PROCEDURE — 97110 THERAPEUTIC EXERCISES: CPT | Performed by: PHYSICAL THERAPIST

## 2022-11-03 PROCEDURE — 97140 MANUAL THERAPY 1/> REGIONS: CPT | Performed by: PHYSICAL THERAPIST

## 2022-11-10 ENCOUNTER — OFFICE VISIT (OUTPATIENT)
Dept: ORTHOPEDIC SURGERY | Age: 62
End: 2022-11-10
Payer: COMMERCIAL

## 2022-11-10 DIAGNOSIS — M75.41 IMPINGEMENT SYNDROME OF RIGHT SHOULDER: ICD-10-CM

## 2022-11-10 DIAGNOSIS — M25.511 ACUTE PAIN OF RIGHT SHOULDER: Primary | ICD-10-CM

## 2022-11-10 DIAGNOSIS — M25.611 STIFFNESS OF RIGHT SHOULDER JOINT: ICD-10-CM

## 2022-11-10 PROCEDURE — 97110 THERAPEUTIC EXERCISES: CPT | Performed by: PHYSICAL THERAPIST

## 2022-11-10 PROCEDURE — 97140 MANUAL THERAPY 1/> REGIONS: CPT | Performed by: PHYSICAL THERAPIST

## 2022-11-10 NOTE — PROGRESS NOTES
Patient Name: Shania Hopson  Date:11/10/2022  : 1960  [x]  Patient  Verified  Payor: Darcia Boxer / Plan: [place] [employer] [id number] PPO / Product Type: PPO /    Total Treatment Time (min): 45 min  1:1 Treatment Time ( only):   Referring provider: Sahil Bates DO  1. Acute pain of right shoulder  2. Stiffness of right shoulder joint  3. Impingement syndrome of right shoulder      SUBJECTIVE  Doing great. A little sore but not bad. OBJECTIVE  AROM:   PROM: elevation:130 degrees, abd:100 degrees  TTP over   Modality:   []  E-Stim: type _ x _ min     []att   []unatt   []w/US   []w/ice   []w/heat  []  Ultrasound: []cont   []pulse    _ W/cm2 x _  min   []1MHz   []3MHz  []  Ice pack _  Post       [] Hot pack _  Pre       []  Other:    Man: 20 min GH mobilization to the posterior/inferior capsule in combination with PROM into all directions as tolerated. NMR:  min  Neuromuscular reeducation/proprioceptive training listed in exercise below. Ex: 25 min  Therapeutic exercise/strength/endurance completed here in clinic today per the exercise log. PT Exercise Log         EXERCISE 11/10/2022   Pulleys 20x   Scap retractions 20x   Standing ff stretch 10x10\"   Bicep curls 20x 1#   Supine cane OH 20x   T band rows, ext  20x   Cane IR 20x   Tband IR 20x                                                      ASSESSMENT  [x]  See Plan of Care  [x]  Patient will continue to benefit from skilled therapy to address remaining functional deficits:   Patient progressing well. Has intermittent c/o pain w/abduction when she does it accidentally. Using RUE for below shoulder IADLs. PLAN  Continue with current plan of care and progress as appropriate towards functional goals.   [x]  Upgrade activities as tolerated     [x]  Continue plan of care  []  Discharge due to:_  [] Other:_       Yarelis Gutierrez, PT  11/10/2022    1:10 PM

## 2022-11-17 ENCOUNTER — OFFICE VISIT (OUTPATIENT)
Dept: ORTHOPEDIC SURGERY | Age: 62
End: 2022-11-17
Payer: COMMERCIAL

## 2022-11-17 DIAGNOSIS — M75.41 IMPINGEMENT SYNDROME OF RIGHT SHOULDER: ICD-10-CM

## 2022-11-17 DIAGNOSIS — M25.611 STIFFNESS OF RIGHT SHOULDER JOINT: ICD-10-CM

## 2022-11-17 DIAGNOSIS — M25.511 ACUTE PAIN OF RIGHT SHOULDER: Primary | ICD-10-CM

## 2022-11-17 PROCEDURE — 97110 THERAPEUTIC EXERCISES: CPT | Performed by: PHYSICAL THERAPIST

## 2022-11-17 PROCEDURE — 97140 MANUAL THERAPY 1/> REGIONS: CPT | Performed by: PHYSICAL THERAPIST

## 2022-11-17 NOTE — PROGRESS NOTES
Patient Name: Tiffanie Rust  Date:2022  : 1960  [x]  Patient  Verified  Payor: Marga Levy / Plan: [employer] [employer] [id number] PPO / Product Type: PPO /    Total Treatment Time (min): 45 min  1:1 Treatment Time ( only):   Referring provider: Ashok Philippe DO  1. Acute pain of right shoulder  2. Stiffness of right shoulder joint  3. Impingement syndrome of right shoulder      SUBJECTIVE  Sore in the front of my arm. I'm using it a lot though. OBJECTIVE  AROM:   PROM: elevation:160 degrees, abd:100 degrees  TTP over anterior RC tt  Modality:   []  E-Stim: type _ x _ min     []att   []unatt   []w/US   []w/ice   []w/heat  []  Ultrasound: []cont   []pulse    _ W/cm2 x _  min   []1MHz   []3MHz  []  Ice pack _  Post       [] Hot pack _  Pre       []  Other:    Man: 20 min GH mobilization to the posterior/inferior capsule in combination with PROM into all directions as tolerated. NMR:  min  Neuromuscular reeducation/proprioceptive training listed in exercise below. Ex: 25 min  Therapeutic exercise/strength/endurance completed here in clinic today per the exercise log. PT Exercise Log         EXERCISE 2022   Pulleys 20x   Scap retractions 20x   Standing ff stretch 10x10\"   Bicep curls 20x 1#   Supine cane OH 20x   T band rows, ext  20x   Cane IR 20x   Tband IR 20x                                                      ASSESSMENT  [x]  See Plan of Care  [x]  Patient will continue to benefit from skilled therapy to address remaining functional deficits:   Has increased c/o pain over anterior shoulder. Patient cautioned to not overdo w/IADLs. Progressing well w/elevation. Continues to be stiff and weak in ER. PLAN  Continue with current plan of care and progress as appropriate towards functional goals.   [x]  Upgrade activities as tolerated     [x]  Continue plan of care  []  Discharge due to:_  [] Other:_       Poonam Sawyer, PT  2022    1:10 PM

## 2022-11-21 ENCOUNTER — OFFICE VISIT (OUTPATIENT)
Dept: ORTHOPEDIC SURGERY | Age: 62
End: 2022-11-21
Payer: COMMERCIAL

## 2022-11-21 VITALS — HEIGHT: 62 IN | BODY MASS INDEX: 26.68 KG/M2 | WEIGHT: 145 LBS

## 2022-11-21 DIAGNOSIS — Z98.890 STATUS POST ARTHROSCOPY OF SHOULDER: Primary | ICD-10-CM

## 2022-11-21 PROCEDURE — 99024 POSTOP FOLLOW-UP VISIT: CPT | Performed by: ORTHOPAEDIC SURGERY

## 2022-11-21 NOTE — PROGRESS NOTES
Andreas Seo (: 1960) is a 57 y.o. female, established patient, here for evaluation of the following chief complaint(s):  Post OP Follow Up and Shoulder Pain       ASSESSMENT/PLAN:  Below is the assessment and plan developed based on review of pertinent history, physical exam, labs, studies, and medications. She will continue with her therapy regimen and home exercise program.  I will plan to see her back in 2 months to assess her progress. 1. Status post arthroscopy of shoulder      Return in about 2 months (around 2023). SUBJECTIVE/OBJECTIVE:  Andreas Seo (: 1960) is a 57 y.o. female. She presents today about 3 months status post right rotator cuff repair. Overall she seems to be doing well. She is progressing with therapy        No Known Allergies    Current Outpatient Medications   Medication Sig    naloxone (NARCAN) 4 mg/actuation nasal spray Use 1 spray intranasally, then discard. Repeat with new spray every 2 min as needed for opioid overdose symptoms, alternating nostrils. apixaban (Eliquis) 5 mg tablet Take 5 mg by mouth two (2) times a day. diazepam (VALIUM) 5 mg tablet Take 1 tablet by mouth every six (6) hours as needed (spasm). (Patient not taking: Reported on 3/1/2022)    oxyCODONE IR (ROXICODONE) 5 mg immediate release tablet Take 1 tablet by mouth every three (3) hours as needed for Pain. (Patient not taking: Reported on 3/1/2022)    acetaminophen (TYLENOL EXTRA STRENGTH) 500 mg tablet Take  by mouth every six (6) hours as needed for Pain. rosuvastatin (CRESTOR) 20 mg tablet Take 20 mg by mouth nightly. Indications: HYPERCHOLESTEROLEMIA    conjugated estrogens (PREMARIN) 0.625 mg tablet Take 0.625 mg by mouth nightly. (Patient not taking: Reported on 3/1/2022)    OMEPRAZOLE PO Take 20 mg by mouth as needed. (Patient not taking: Reported on 3/1/2022)     No current facility-administered medications for this visit.        Social History     Socioeconomic History    Marital status:      Spouse name: Not on file    Number of children: Not on file    Years of education: Not on file    Highest education level: Not on file   Occupational History    Not on file   Tobacco Use    Smoking status: Former     Packs/day: 1.50     Years: 10.00     Pack years: 15.00     Types: Cigarettes    Smokeless tobacco: Never   Vaping Use    Vaping Use: Never used   Substance and Sexual Activity    Alcohol use: Yes     Alcohol/week: 0.8 standard drinks     Types: 1 Cans of beer per week     Comment: occasionally    Drug use: No    Sexual activity: Not on file   Other Topics Concern    Not on file   Social History Narrative    ** Merged History Encounter **          Social Determinants of Health     Financial Resource Strain: Not on file   Food Insecurity: Not on file   Transportation Needs: Not on file   Physical Activity: Not on file   Stress: Not on file   Social Connections: Not on file   Intimate Partner Violence: Not on file   Housing Stability: Not on file       Past Surgical History:   Procedure Laterality Date    HX HYSTERECTOMY      partial    HX TONSILLECTOMY      TX BREAST SURGERY PROCEDURE UNLISTED Left     breast biopsy- neg    TX CARDIAC SURG PROCEDURE UNLIST      ablation       Family History   Problem Relation Age of Onset    Cancer Mother         uterine, colon, thyroid, lung    Heart Disease Father     Lung Disease Father     No Known Problems Sister     No Known Problems Sister     No Known Problems Sister     Colon Cancer Other     Diabetes Other     Heart Disease Other     Hypertension Other     Cancer Other         Uterine and Lung        OB History    No obstetric history on file. REVIEW OF SYSTEMS:  ROS    Positive for: Musculoskeletal  Last edited by Shireen Daugherty on 11/21/2022  8:14 AM.        Patient denies any recent fever, chills, nausea, vomiting, chest pain, or shortness of breath.       Vitals:  Ht 5' 2\" (1.575 m)   Wt 145 lb (65.8 kg) BMI 26.52 kg/m²    Body mass index is 26.52 kg/m². PHYSICAL EXAM:  General exam: Patient is awake, alert, and oriented x3. Well-appearing. No acute distress. Ambulates with a normal gait. Right shoulder: Near normal range of motion compared to opposite extremity. Improving strength is noted with rotator cuff testing. Normal stability. IMAGING:    XR Results (most recent):  Results from Hospital Encounter encounter on 11/11/14    XR SPINE LUMB 2 OR 3 V    Narrative  **Final Report**      ICD Codes / Adm. Diagnosis:    / THORACIC HNP, THORACIC RADICUL  Intervertebral thoracic disc  Examination:  CR L SPINE 2 OR 3 S  - [id number] - Nov 12 2014  3:00PM  Accession No:  [id number]  Reason:  Post-op lumbar fusion      REPORT:  INDICATION:   Post-op lumbar fusion    COMPARISON: None    FINDINGS:    AP, lateral, views of the lumbar spine demonstrate posterior fusion hardware  T11-12 with interbody graft marker at this level. There is slight rightward  curvature of the lumbar spine at L3. No evidence of acute fracture. Mild  degenerative osteophytes T12-L1, L1-L2 and L2-3. The sacroiliac joints are  intact. Impression  :  Status post fusion T11-12. Mild degenerative changes. Signing/Reading Doctor: Smitha Swift ([id number])  Approved: Smitha Swift ([id number])  Nov 12 2014  3:15PM      XR SPINE LUMB 2 OR 3 V    Narrative  **Final Report**      ICD Codes / Adm. Diagnosis:    / THORACIC HNP, THORACIC RADICUL  Intervertebral thoracic disc  Examination:  CR L SPINE 2 OR 3 S  - [id number] - Nov 11 2014 10:00AM  Accession No:  [id number]  Reason:  OR PROCEDURE      REPORT:  EXAM:  CR L SPINE 2 OR 3 VWS    INDICATION:  Back pain requiring surgery    COMPARISON: None. TECHNIQUE: 3 spot fluoroscopic images of 2 views intraoperative lumbar  spine. Compliance only. FINDINGS: Fusion level appears to be T11-T12. Bilateral interpedicular  screws and bilateral vertical fusion rods.  Disc prosthesis is in good  position. No evidence of fracture or subluxation. Total fluoroscopy time of 1.8 minutes. Impression  :    Spinal fusion in progress. Signing/Reading Doctor: Radha Rajput ([id number])  Approved: Radha Rajput ([id number])  Nov 11 2014  2:13PM      XR FLUOROSCOPY OVER 60 MINUTES    Narrative  **Final Report**      ICD Codes / Adm. Diagnosis:    / THORACIC HNP, THORACIC RADICUL  Intervertebral thoracic disc  Examination:  CR FLUORO GUIDE GT 1 HR  - [id number] - Nov 11 2014 10:00AM  Accession No:  [id number]  Reason:  OR PROCEDURE      REPORT:  Fluoroscopy time was provided. Impression  :  Fluoroscopy time was provided. [facility]          Signing/Reading Doctor: BSR  ([id number])  [facility]  ([id number])  Nov 18 2014  8:03AM         No orders of the defined types were placed in this encounter. An electronic signature was used to authenticate this note.   -- Syeda Wong, DO

## 2022-11-23 ENCOUNTER — OFFICE VISIT (OUTPATIENT)
Dept: ORTHOPEDIC SURGERY | Age: 62
End: 2022-11-23
Payer: COMMERCIAL

## 2022-11-23 DIAGNOSIS — M25.611 STIFFNESS OF RIGHT SHOULDER JOINT: ICD-10-CM

## 2022-11-23 DIAGNOSIS — M75.41 IMPINGEMENT SYNDROME OF RIGHT SHOULDER: ICD-10-CM

## 2022-11-23 DIAGNOSIS — M25.511 ACUTE PAIN OF RIGHT SHOULDER: Primary | ICD-10-CM

## 2022-11-23 PROCEDURE — 97140 MANUAL THERAPY 1/> REGIONS: CPT | Performed by: PHYSICAL THERAPIST

## 2022-11-23 PROCEDURE — 97110 THERAPEUTIC EXERCISES: CPT | Performed by: PHYSICAL THERAPIST

## 2022-11-23 NOTE — PROGRESS NOTES
Patient Name: Azucena Parson  Date:2022  : 1960  [x]  Patient  Verified  Payor: Katherin Tafoya / Plan: [place] [id number] PPO / Product Type: PPO /    Total Treatment Time (min): 45 min  1:1 Treatment Time ( only):   Referring provider: Sharon Goldman DO  1. Acute pain of right shoulder  2. Stiffness of right shoulder joint  3. Impingement syndrome of right shoulder      SUBJECTIVE  Doing ok. A little sore, but can raise my arm above my head. MD was pleased. OBJECTIVE  AROM:   PROM: elevation:170 degrees, abd:160 degrees  TTP over anterior RC tt  Modality:   []  E-Stim: type _ x _ min     []att   []unatt   []w/US   []w/ice   []w/heat  []  Ultrasound: []cont   []pulse    _ W/cm2 x _  min   []1MHz   []3MHz  [x]  Ice pack _  Post       [] Hot pack _  Pre       []  Other:    Man: 15 min GH mobilization to the posterior/inferior capsule in combination with PROM into all directions as tolerated. NMR:  min  Neuromuscular reeducation/proprioceptive training listed in exercise below. Ex: 30 min  Therapeutic exercise/strength/endurance completed here in clinic today per the exercise log. PT Exercise Log         EXERCISE 2022   Pulleys 20x   Scap retractions 20x   Standing ff stretch 10x10\"   Bicep curls 20x 1#   Supine cane OH 20x   T band rows, ext  20x   Cane IR 20x   Tband IR 20x   AAROM w/stick 20x   S/l ER 20x                                              ASSESSMENT  [x]  See Plan of Care  [x]  Patient will continue to benefit from skilled therapy to address remaining functional deficits:   Progressing well per POC. Decreased overall pain. Continues to be stiff in IR. PLAN  Continue with current plan of care and progress as appropriate towards functional goals.   [x]  Upgrade activities as tolerated     [x]  Continue plan of care  []  Discharge due to:_  [] Other:_       Dino Gustafson, PT  2022    1:10 PM

## 2022-11-30 ENCOUNTER — OFFICE VISIT (OUTPATIENT)
Dept: ORTHOPEDIC SURGERY | Age: 62
End: 2022-11-30
Payer: COMMERCIAL

## 2022-11-30 DIAGNOSIS — M75.41 IMPINGEMENT SYNDROME OF RIGHT SHOULDER: ICD-10-CM

## 2022-11-30 DIAGNOSIS — M25.511 ACUTE PAIN OF RIGHT SHOULDER: Primary | ICD-10-CM

## 2022-11-30 DIAGNOSIS — M25.611 STIFFNESS OF RIGHT SHOULDER JOINT: ICD-10-CM

## 2022-11-30 NOTE — PROGRESS NOTES
Patient Name: Wilton Arvizu  Date:2022  : 1960  [x]  Patient  Verified  Payor: Rupert Isaac / Plan: [place] [employer] [id number] PPO / Product Type: PPO /    Total Treatment Time (min): 45 min  1:1 Treatment Time ( only):   Referring provider: Arden Shah DO  1. Acute pain of right shoulder  2. Stiffness of right shoulder joint  3. Impingement syndrome of right shoulder      SUBJECTIVE  I feel ok. Just a little sore. OBJECTIVE  AROM:   PROM: elevation:170 degrees, abd:165 degrees  TTP over   Modality:   []  E-Stim: type _ x _ min     []att   []unatt   []w/US   []w/ice   []w/heat  []  Ultrasound: []cont   []pulse    _ W/cm2 x _  min   []1MHz   []3MHz  [x]  Ice pack _  Post       [] Hot pack _  Pre       []  Other:    Man: 15 min GH mobilization to the posterior/inferior capsule in combination with PROM into all directions as tolerated. NMR:  min  Neuromuscular reeducation/proprioceptive training listed in exercise below. Ex: 30 min  Therapeutic exercise/strength/endurance completed here in clinic today per the exercise log. PT Exercise Log         EXERCISE 2022   Pulleys 20x   Scap retractions 20x   Standing ff stretch 10x10\"   Bicep curls 20x 1#   Supine cane OH 20x   T band rows, ext  20x   Cane IR 20x   Tband IR 20x   AAROM w/stick 20x   S/l ER 20x   IR strap stretch 10x10\"                                          ASSESSMENT  [x]  See Plan of Care  [x]  Patient will continue to benefit from skilled therapy to address remaining functional deficits:   Has continued increased AROM. Stiffness in IR. Has increased overall strength. Using RUE for IADLs. Patient cautioned to not overuse RUE. PLAN  Continue with current plan of care and progress as appropriate towards functional goals.   [x]  Upgrade activities as tolerated     [x]  Continue plan of care  []  Discharge due to:_  [] Other:_       Doug Gandhi, PT  2022    1:10 PM

## 2023-01-05 ENCOUNTER — OFFICE VISIT (OUTPATIENT)
Dept: ORTHOPEDIC SURGERY | Age: 63
End: 2023-01-05
Payer: COMMERCIAL

## 2023-01-05 DIAGNOSIS — M25.611 STIFFNESS OF RIGHT SHOULDER JOINT: ICD-10-CM

## 2023-01-05 DIAGNOSIS — M25.511 ACUTE PAIN OF RIGHT SHOULDER: Primary | ICD-10-CM

## 2023-01-05 DIAGNOSIS — Z98.890 STATUS POST ARTHROSCOPY OF SHOULDER: ICD-10-CM

## 2023-01-05 PROCEDURE — 97110 THERAPEUTIC EXERCISES: CPT | Performed by: PHYSICAL THERAPIST

## 2023-01-05 PROCEDURE — 97140 MANUAL THERAPY 1/> REGIONS: CPT | Performed by: PHYSICAL THERAPIST

## 2023-01-05 NOTE — PROGRESS NOTES
Patient Name: Munir Jc  Date:2023  : 1960  [x]  Patient  Verified  Payor: Humberto Blanca / Plan: [place] 5747 PPO / Product Type: PPO /    Total Treatment Time (min): 45 min  1:1 Treatment Time ( only):   Referring provider: Ambrose Jensen DO  1. Acute pain of right shoulder  2. Stiffness of right shoulder joint  3. Status post arthroscopy of shoulder      SUBJECTIVE  Feeling a lot better. I can hook my bra in the back. OBJECTIVE  AROM:   PROM: elevation:170 degrees, abd:170 degrees, IR: thumb to T12  TTP over   Modality:   []  E-Stim: type _ x _ min     []att   []unatt   []w/US   []w/ice   []w/heat  []  Ultrasound: []cont   []pulse    _ W/cm2 x _  min   []1MHz   []3MHz  [x]  Ice pack _  Post       [] Hot pack _  Pre       []  Other:    Man: 15 min GH mobilization to the posterior/inferior capsule in combination with PROM into all directions as tolerated. NMR:  min  Neuromuscular reeducation/proprioceptive training listed in exercise below. Ex: 30 min  Therapeutic exercise/strength/endurance completed here in clinic today per the exercise log. PT Exercise Log         EXERCISE 2023   Pulleys 20x   Scap retractions 20x   Standing ff stretch 10x10\"   Bicep curls 20x 1#   Supine cane OH 20x   T band rows, ext  20x   Cane IR 20x   Tband IR 20x   AAROM w/stick 20x   S/l ER 20x   IR strap stretch 10x10\"                                          ASSESSMENT  [x]  See Plan of Care  [x]  Patient will continue to benefit from skilled therapy to address remaining functional deficits:   Patient states that she has had covid and was not able to do HEP. Patient has increased AROM in all planes. Has increased strength as well. Will d/c to HEP. All goals met. PLAN  Continue with current plan of care and progress as appropriate towards functional goals. []  Upgrade activities as tolerated     []  Continue plan of care  [x]  Discharge due to: goals being met.   [] Other:_ Shira Brown, PT  1/5/2023    1:10 PM

## 2023-01-25 ENCOUNTER — OFFICE VISIT (OUTPATIENT)
Dept: ORTHOPEDIC SURGERY | Age: 63
End: 2023-01-25
Payer: COMMERCIAL

## 2023-01-25 VITALS — HEIGHT: 62 IN | WEIGHT: 145 LBS | BODY MASS INDEX: 26.68 KG/M2

## 2023-01-25 DIAGNOSIS — Z98.890 STATUS POST ARTHROSCOPY OF SHOULDER: Primary | ICD-10-CM

## 2023-01-25 NOTE — PROGRESS NOTES
Marquis Monroe (: 1960) is a 57 y.o. female, established patient, here for evaluation of the following chief complaint(s):  Post OP Follow Up and Shoulder Pain       ASSESSMENT/PLAN:  Below is the assessment and plan developed based on review of pertinent history, physical exam, labs, studies, and medications. She is progressing very well and has finished physical therapy. I will plan to see her back on an as-needed basis at this time    1. Status post arthroscopy of shoulder      Return if symptoms worsen or fail to improve. SUBJECTIVE/OBJECTIVE:  Marquis Monroe (: 1960) is a 57 y.o. female. She presents today for follow-up of her rotator cuff repair. Overall she notes that she is progressing very well and has no complaints. She has finished physical therapy        No Known Allergies    Current Outpatient Medications   Medication Sig    naloxone (NARCAN) 4 mg/actuation nasal spray Use 1 spray intranasally, then discard. Repeat with new spray every 2 min as needed for opioid overdose symptoms, alternating nostrils. apixaban (Eliquis) 5 mg tablet Take 5 mg by mouth two (2) times a day. diazepam (VALIUM) 5 mg tablet Take 1 tablet by mouth every six (6) hours as needed (spasm). (Patient not taking: Reported on 3/1/2022)    oxyCODONE IR (ROXICODONE) 5 mg immediate release tablet Take 1 tablet by mouth every three (3) hours as needed for Pain. (Patient not taking: Reported on 3/1/2022)    acetaminophen (TYLENOL EXTRA STRENGTH) 500 mg tablet Take  by mouth every six (6) hours as needed for Pain. rosuvastatin (CRESTOR) 20 mg tablet Take 20 mg by mouth nightly. Indications: HYPERCHOLESTEROLEMIA    conjugated estrogens (PREMARIN) 0.625 mg tablet Take 0.625 mg by mouth nightly. (Patient not taking: Reported on 3/1/2022)    OMEPRAZOLE PO Take 20 mg by mouth as needed. (Patient not taking: Reported on 3/1/2022)     No current facility-administered medications for this visit.        Social History Socioeconomic History    Marital status:      Spouse name: Not on file    Number of children: Not on file    Years of education: Not on file    Highest education level: Not on file   Occupational History    Not on file   Tobacco Use    Smoking status: Former     Packs/day: 1.50     Years: 10.00     Pack years: 15.00     Types: Cigarettes    Smokeless tobacco: Never   Vaping Use    Vaping Use: Never used   Substance and Sexual Activity    Alcohol use: Yes     Alcohol/week: 0.8 standard drinks     Types: 1 Cans of beer per week     Comment: occasionally    Drug use: No    Sexual activity: Not on file   Other Topics Concern    Not on file   Social History Narrative    ** Merged History Encounter **          Social Determinants of Health     Financial Resource Strain: Not on file   Food Insecurity: Not on file   Transportation Needs: Not on file   Physical Activity: Not on file   Stress: Not on file   Social Connections: Not on file   Intimate Partner Violence: Not on file   Housing Stability: Not on file       Past Surgical History:   Procedure Laterality Date    HX HYSTERECTOMY      partial    HX TONSILLECTOMY      SC UNLISTED PROCEDURE BREAST Left     breast biopsy- neg    SC UNLISTED PROCEDURE CARDIAC SURGERY      ablation       Family History   Problem Relation Age of Onset    Cancer Mother         uterine, colon, thyroid, lung    Heart Disease Father     Lung Disease Father     No Known Problems Sister     No Known Problems Sister     No Known Problems Sister     Colon Cancer Other     Diabetes Other     Heart Disease Other     Hypertension Other     Cancer Other         Uterine and Lung        OB History    No obstetric history on file. REVIEW OF SYSTEMS:  ROS    Positive for: Musculoskeletal  Last edited by Ranjit Flores on 1/25/2023  9:36 AM.        Patient denies any recent fever, chills, nausea, vomiting, chest pain, or shortness of breath.       Vitals:  Ht 5' 2\" (1.575 m)   Wt 145 lb (65.8 kg)   BMI 26.52 kg/m²    Body mass index is 26.52 kg/m². PHYSICAL EXAM:  General exam: Patient is awake, alert, and oriented x3. Well-appearing. No acute distress. Ambulates with a normal gait. Heart/Lungs:  no respiratory distress, palpable pulses    Right shoulder: Near normal active and passive range of motion of the shoulder. Improved rotator cuff strength. Normal stability    IMAGING:    XR Results (most recent):  Results from Hospital Encounter encounter on 11/11/14    XR SPINE LUMB 2 OR 3 V    Narrative  **Final Report**      ICD Codes / Adm. Diagnosis:    / THORACIC HNP, THORACIC RADICUL  Intervertebral thoracic disc  Examination:  CR L SPINE 2 OR 3 [facility]  - [id number] - Nov 12 2014  3:00PM  Accession No:  [id number]  Reason:  Post-op lumbar fusion      REPORT:  INDICATION:   Post-op lumbar fusion    COMPARISON: None    FINDINGS:    AP, lateral, views of the lumbar spine demonstrate posterior fusion hardware  T11-12 with interbody graft marker at this level. There is slight rightward  curvature of the lumbar spine at L3. No evidence of acute fracture. Mild  degenerative osteophytes T12-L1, L1-L2 and L2-3. The sacroiliac joints are  intact. Impression  :  Status post fusion T11-12. Mild degenerative changes. Signing/Reading Doctor: Megan Alejandro ([id number])  Approved: Megan Alejandro ([id number])  Nov 12 2014  3:15PM      XR SPINE LUMB 2 OR 3 V    Narrative  **Final Report**      ICD Codes / Adm. Diagnosis:    / THORACIC HNP, THORACIC RADICUL  Intervertebral thoracic disc  Examination:  CR L SPINE 2 OR 3 [facility]  - [id number] - Nov 11 2014 10:00AM  Accession No:  [id number]  Reason:  OR PROCEDURE      REPORT:  EXAM:  CR L SPINE 2 OR 3 VWS    INDICATION:  Back pain requiring surgery    COMPARISON: None. TECHNIQUE: 3 spot fluoroscopic images of 2 views intraoperative lumbar  spine. Compliance only. FINDINGS: Fusion level appears to be T11-T12.  Bilateral interpedicular  screws and bilateral vertical fusion rods. Disc prosthesis is in good  position. No evidence of fracture or subluxation. Total fluoroscopy time of 1.8 minutes. Impression  :    Spinal fusion in progress. Signing/Reading Doctor: Herve Beyer ([id number])  Approved: Herve Beyer ([id number])  Nov 11 2014  2:13PM      XR FLUOROSCOPY OVER 60 MINUTES    Narrative  **Final Report**      ICD Codes / Adm. Diagnosis:    / THORACIC HNP, THORACIC RADICUL  Intervertebral thoracic disc  Examination:  CR FLUORO GUIDE GT 1 HR  - [id number] - Nov 11 2014 10:00AM  Accession No:  [id number]  Reason:  OR PROCEDURE      REPORT:  Fluoroscopy time was provided. Impression  :  Fluoroscopy time was provided. [facility]          Signing/Reading Doctor: MIREYA  ([id number])  Makayla Muro  ([id number])  Nov 18 2014  8:03AM         No orders of the defined types were placed in this encounter. An electronic signature was used to authenticate this note.   -- Lauren Coulter,